# Patient Record
Sex: MALE | Race: WHITE | Employment: OTHER | URBAN - METROPOLITAN AREA
[De-identification: names, ages, dates, MRNs, and addresses within clinical notes are randomized per-mention and may not be internally consistent; named-entity substitution may affect disease eponyms.]

---

## 2019-01-01 ENCOUNTER — APPOINTMENT (OUTPATIENT)
Dept: CT IMAGING | Facility: HOSPITAL | Age: 84
DRG: 689 | End: 2019-01-01
Attending: INTERNAL MEDICINE
Payer: MEDICARE

## 2019-01-01 ENCOUNTER — APPOINTMENT (OUTPATIENT)
Dept: ULTRASOUND IMAGING | Facility: HOSPITAL | Age: 84
DRG: 689 | End: 2019-01-01
Attending: INTERNAL MEDICINE
Payer: MEDICARE

## 2019-01-01 ENCOUNTER — HOSPITAL ENCOUNTER (INPATIENT)
Facility: HOSPITAL | Age: 84
LOS: 7 days | Discharge: NURSING, REHAB OR LONG-TERM CARE | DRG: 689 | End: 2019-01-01
Attending: EMERGENCY MEDICINE | Admitting: INTERNAL MEDICINE
Payer: MEDICARE

## 2019-01-01 ENCOUNTER — APPOINTMENT (OUTPATIENT)
Dept: NUCLEAR MEDICINE | Facility: HOSPITAL | Age: 84
DRG: 689 | End: 2019-01-01
Attending: HOSPITALIST
Payer: MEDICARE

## 2019-01-01 ENCOUNTER — APPOINTMENT (OUTPATIENT)
Dept: CT IMAGING | Facility: HOSPITAL | Age: 84
DRG: 689 | End: 2019-01-01
Attending: Other
Payer: MEDICARE

## 2019-01-01 ENCOUNTER — APPOINTMENT (OUTPATIENT)
Dept: GENERAL RADIOLOGY | Facility: HOSPITAL | Age: 84
DRG: 689 | End: 2019-01-01
Attending: HOSPITALIST
Payer: MEDICARE

## 2019-01-01 ENCOUNTER — APPOINTMENT (OUTPATIENT)
Dept: GENERAL RADIOLOGY | Facility: HOSPITAL | Age: 84
DRG: 689 | End: 2019-01-01
Attending: EMERGENCY MEDICINE
Payer: MEDICARE

## 2019-01-01 VITALS
WEIGHT: 205 LBS | SYSTOLIC BLOOD PRESSURE: 117 MMHG | BODY MASS INDEX: 29.35 KG/M2 | HEIGHT: 70 IN | OXYGEN SATURATION: 97 % | DIASTOLIC BLOOD PRESSURE: 61 MMHG | HEART RATE: 67 BPM | RESPIRATION RATE: 18 BRPM | TEMPERATURE: 98 F

## 2019-01-01 DIAGNOSIS — R46.89 AGGRESSIVE BEHAVIOR: ICD-10-CM

## 2019-01-01 DIAGNOSIS — N30.00 ACUTE CYSTITIS WITHOUT HEMATURIA: Primary | ICD-10-CM

## 2019-01-01 LAB
ALBUMIN SERPL-MCNC: 2.8 G/DL (ref 3.4–5)
ALBUMIN SERPL-MCNC: 3.1 G/DL (ref 3.4–5)
ALBUMIN/GLOB SERPL: 0.6 {RATIO} (ref 1–2)
ALBUMIN/GLOB SERPL: 0.7 {RATIO} (ref 1–2)
ALP LIVER SERPL-CCNC: 72 U/L (ref 45–117)
ALP LIVER SERPL-CCNC: 84 U/L (ref 45–117)
ALT SERPL-CCNC: 17 U/L (ref 16–61)
ALT SERPL-CCNC: 18 U/L (ref 16–61)
ANION GAP SERPL CALC-SCNC: 3 MMOL/L (ref 0–18)
ANION GAP SERPL CALC-SCNC: 4 MMOL/L (ref 0–18)
ANION GAP SERPL CALC-SCNC: 5 MMOL/L (ref 0–18)
ANION GAP SERPL CALC-SCNC: 7 MMOL/L (ref 0–18)
ANION GAP SERPL CALC-SCNC: 7 MMOL/L (ref 0–18)
AST SERPL-CCNC: 16 U/L (ref 15–37)
AST SERPL-CCNC: 18 U/L (ref 15–37)
ATRIAL RATE: 82 BPM
BASOPHILS # BLD AUTO: 0.03 X10(3) UL (ref 0–0.2)
BASOPHILS # BLD AUTO: 0.07 X10(3) UL (ref 0–0.2)
BASOPHILS # BLD AUTO: 0.08 X10(3) UL (ref 0–0.2)
BASOPHILS NFR BLD AUTO: 0.4 %
BASOPHILS NFR BLD AUTO: 0.7 %
BASOPHILS NFR BLD AUTO: 0.8 %
BILIRUB SERPL-MCNC: 0.5 MG/DL (ref 0.1–2)
BILIRUB SERPL-MCNC: 0.5 MG/DL (ref 0.1–2)
BILIRUB UR QL STRIP.AUTO: NEGATIVE
BUN BLD-MCNC: 13 MG/DL (ref 7–18)
BUN BLD-MCNC: 15 MG/DL (ref 7–18)
BUN BLD-MCNC: 16 MG/DL (ref 7–18)
BUN BLD-MCNC: 23 MG/DL (ref 7–18)
BUN BLD-MCNC: 28 MG/DL (ref 7–18)
BUN/CREAT SERPL: 11.8 (ref 10–20)
BUN/CREAT SERPL: 15.2 (ref 10–20)
BUN/CREAT SERPL: 15.5 (ref 10–20)
BUN/CREAT SERPL: 21.1 (ref 10–20)
BUN/CREAT SERPL: 26.7 (ref 10–20)
CALCIUM BLD-MCNC: 8.5 MG/DL (ref 8.5–10.1)
CALCIUM BLD-MCNC: 8.7 MG/DL (ref 8.5–10.1)
CALCIUM BLD-MCNC: 8.9 MG/DL (ref 8.5–10.1)
CALCIUM BLD-MCNC: 8.9 MG/DL (ref 8.5–10.1)
CALCIUM BLD-MCNC: 9 MG/DL (ref 8.5–10.1)
CHLORIDE SERPL-SCNC: 104 MMOL/L (ref 98–112)
CHLORIDE SERPL-SCNC: 105 MMOL/L (ref 98–112)
CHLORIDE SERPL-SCNC: 106 MMOL/L (ref 98–112)
CHLORIDE SERPL-SCNC: 106 MMOL/L (ref 98–112)
CHLORIDE SERPL-SCNC: 108 MMOL/L (ref 98–112)
CHOLEST SMN-MCNC: 200 MG/DL (ref ?–200)
CLARITY UR REFRACT.AUTO: CLEAR
CO2 SERPL-SCNC: 28 MMOL/L (ref 21–32)
CO2 SERPL-SCNC: 29 MMOL/L (ref 21–32)
CO2 SERPL-SCNC: 29 MMOL/L (ref 21–32)
CO2 SERPL-SCNC: 30 MMOL/L (ref 21–32)
CO2 SERPL-SCNC: 30 MMOL/L (ref 21–32)
COLOR UR AUTO: YELLOW
CREAT BLD-MCNC: 0.97 MG/DL (ref 0.7–1.3)
CREAT BLD-MCNC: 1.05 MG/DL (ref 0.7–1.3)
CREAT BLD-MCNC: 1.05 MG/DL (ref 0.7–1.3)
CREAT BLD-MCNC: 1.09 MG/DL (ref 0.7–1.3)
CREAT BLD-MCNC: 1.1 MG/DL (ref 0.7–1.3)
DEPRECATED RDW RBC AUTO: 51.3 FL (ref 35.1–46.3)
DEPRECATED RDW RBC AUTO: 51.5 FL (ref 35.1–46.3)
DEPRECATED RDW RBC AUTO: 53.3 FL (ref 35.1–46.3)
DEPRECATED RDW RBC AUTO: 53.6 FL (ref 35.1–46.3)
EOSINOPHIL # BLD AUTO: 0.01 X10(3) UL (ref 0–0.7)
EOSINOPHIL # BLD AUTO: 0.19 X10(3) UL (ref 0–0.7)
EOSINOPHIL # BLD AUTO: 0.22 X10(3) UL (ref 0–0.7)
EOSINOPHIL NFR BLD AUTO: 0.1 %
EOSINOPHIL NFR BLD AUTO: 1.9 %
EOSINOPHIL NFR BLD AUTO: 2.2 %
ERYTHROCYTE [DISTWIDTH] IN BLOOD BY AUTOMATED COUNT: 14.6 % (ref 11–15)
ERYTHROCYTE [DISTWIDTH] IN BLOOD BY AUTOMATED COUNT: 14.6 % (ref 11–15)
ERYTHROCYTE [DISTWIDTH] IN BLOOD BY AUTOMATED COUNT: 14.8 % (ref 11–15)
ERYTHROCYTE [DISTWIDTH] IN BLOOD BY AUTOMATED COUNT: 14.9 % (ref 11–15)
GLOBULIN PLAS-MCNC: 4.4 G/DL (ref 2.8–4.4)
GLOBULIN PLAS-MCNC: 4.4 G/DL (ref 2.8–4.4)
GLUCOSE BLD-MCNC: 100 MG/DL (ref 70–99)
GLUCOSE BLD-MCNC: 125 MG/DL (ref 70–99)
GLUCOSE BLD-MCNC: 147 MG/DL (ref 70–99)
GLUCOSE BLD-MCNC: 85 MG/DL (ref 70–99)
GLUCOSE BLD-MCNC: 88 MG/DL (ref 70–99)
GLUCOSE UR STRIP.AUTO-MCNC: NEGATIVE MG/DL
HCT VFR BLD AUTO: 43.2 % (ref 39–53)
HCT VFR BLD AUTO: 45.5 % (ref 39–53)
HCT VFR BLD AUTO: 45.7 % (ref 39–53)
HCT VFR BLD AUTO: 48.3 % (ref 39–53)
HDLC SERPL-MCNC: 50 MG/DL (ref 40–59)
HGB BLD-MCNC: 13.9 G/DL (ref 13–17.5)
HGB BLD-MCNC: 14.5 G/DL (ref 13–17.5)
HGB BLD-MCNC: 14.6 G/DL (ref 13–17.5)
HGB BLD-MCNC: 15.3 G/DL (ref 13–17.5)
IMM GRANULOCYTES # BLD AUTO: 0.03 X10(3) UL (ref 0–1)
IMM GRANULOCYTES # BLD AUTO: 0.04 X10(3) UL (ref 0–1)
IMM GRANULOCYTES # BLD AUTO: 0.04 X10(3) UL (ref 0–1)
IMM GRANULOCYTES NFR BLD: 0.4 %
KETONES UR STRIP.AUTO-MCNC: NEGATIVE MG/DL
LDLC SERPL CALC-MCNC: 133 MG/DL (ref ?–100)
LEUKOCYTE ESTERASE UR QL STRIP.AUTO: NEGATIVE
LYMPHOCYTES # BLD AUTO: 1.33 X10(3) UL (ref 1–4)
LYMPHOCYTES # BLD AUTO: 2.15 X10(3) UL (ref 1–4)
LYMPHOCYTES # BLD AUTO: 2.57 X10(3) UL (ref 1–4)
LYMPHOCYTES NFR BLD AUTO: 19.2 %
LYMPHOCYTES NFR BLD AUTO: 21.5 %
LYMPHOCYTES NFR BLD AUTO: 25.3 %
M PROTEIN MFR SERPL ELPH: 7.2 G/DL (ref 6.4–8.2)
M PROTEIN MFR SERPL ELPH: 7.5 G/DL (ref 6.4–8.2)
MCH RBC QN AUTO: 30.6 PG (ref 26–34)
MCH RBC QN AUTO: 30.7 PG (ref 26–34)
MCH RBC QN AUTO: 30.9 PG (ref 26–34)
MCH RBC QN AUTO: 31 PG (ref 26–34)
MCHC RBC AUTO-ENTMCNC: 31.7 G/DL (ref 31–37)
MCHC RBC AUTO-ENTMCNC: 31.9 G/DL (ref 31–37)
MCHC RBC AUTO-ENTMCNC: 31.9 G/DL (ref 31–37)
MCHC RBC AUTO-ENTMCNC: 32.2 G/DL (ref 31–37)
MCV RBC AUTO: 96.2 FL (ref 80–100)
MCV RBC AUTO: 96.4 FL (ref 80–100)
MCV RBC AUTO: 96.6 FL (ref 80–100)
MCV RBC AUTO: 96.8 FL (ref 80–100)
MONOCYTES # BLD AUTO: 0.08 X10(3) UL (ref 0.1–1)
MONOCYTES # BLD AUTO: 0.66 X10(3) UL (ref 0.1–1)
MONOCYTES # BLD AUTO: 0.8 X10(3) UL (ref 0.1–1)
MONOCYTES NFR BLD AUTO: 1.2 %
MONOCYTES NFR BLD AUTO: 6.6 %
MONOCYTES NFR BLD AUTO: 7.9 %
NEUTROPHILS # BLD AUTO: 5.44 X10 (3) UL (ref 1.5–7.7)
NEUTROPHILS # BLD AUTO: 5.44 X10(3) UL (ref 1.5–7.7)
NEUTROPHILS # BLD AUTO: 6.47 X10 (3) UL (ref 1.5–7.7)
NEUTROPHILS # BLD AUTO: 6.47 X10(3) UL (ref 1.5–7.7)
NEUTROPHILS # BLD AUTO: 6.87 X10 (3) UL (ref 1.5–7.7)
NEUTROPHILS # BLD AUTO: 6.87 X10(3) UL (ref 1.5–7.7)
NEUTROPHILS NFR BLD AUTO: 63.8 %
NEUTROPHILS NFR BLD AUTO: 68.5 %
NEUTROPHILS NFR BLD AUTO: 78.7 %
NITRITE UR QL STRIP.AUTO: NEGATIVE
NONHDLC SERPL-MCNC: 150 MG/DL (ref ?–130)
OSMOLALITY SERPL CALC.SUM OF ELEC: 286 MOSM/KG (ref 275–295)
OSMOLALITY SERPL CALC.SUM OF ELEC: 290 MOSM/KG (ref 275–295)
OSMOLALITY SERPL CALC.SUM OF ELEC: 291 MOSM/KG (ref 275–295)
OSMOLALITY SERPL CALC.SUM OF ELEC: 296 MOSM/KG (ref 275–295)
OSMOLALITY SERPL CALC.SUM OF ELEC: 303 MOSM/KG (ref 275–295)
P AXIS: 47 DEGREES
P-R INTERVAL: 194 MS
PH UR STRIP.AUTO: 5 [PH] (ref 4.5–8)
PLATELET # BLD AUTO: 211 10(3)UL (ref 150–450)
PLATELET # BLD AUTO: 237 10(3)UL (ref 150–450)
PLATELET # BLD AUTO: 264 10(3)UL (ref 150–450)
PLATELET # BLD AUTO: 316 10(3)UL (ref 150–450)
POTASSIUM SERPL-SCNC: 3.8 MMOL/L (ref 3.5–5.1)
POTASSIUM SERPL-SCNC: 3.9 MMOL/L (ref 3.5–5.1)
POTASSIUM SERPL-SCNC: 4.1 MMOL/L (ref 3.5–5.1)
POTASSIUM SERPL-SCNC: 4.1 MMOL/L (ref 3.5–5.1)
POTASSIUM SERPL-SCNC: 4.3 MMOL/L (ref 3.5–5.1)
POTASSIUM SERPL-SCNC: 4.4 MMOL/L (ref 3.5–5.1)
PROT UR STRIP.AUTO-MCNC: NEGATIVE MG/DL
Q-T INTERVAL: 398 MS
QRS DURATION: 72 MS
QTC CALCULATION (BEZET): 464 MS
R AXIS: 41 DEGREES
RBC # BLD AUTO: 4.48 X10(6)UL (ref 3.8–5.8)
RBC # BLD AUTO: 4.72 X10(6)UL (ref 3.8–5.8)
RBC # BLD AUTO: 4.73 X10(6)UL (ref 3.8–5.8)
RBC # BLD AUTO: 5 X10(6)UL (ref 3.8–5.8)
RBC UR QL AUTO: NEGATIVE
SODIUM SERPL-SCNC: 138 MMOL/L (ref 136–145)
SODIUM SERPL-SCNC: 140 MMOL/L (ref 136–145)
SODIUM SERPL-SCNC: 143 MMOL/L (ref 136–145)
SP GR UR STRIP.AUTO: 1.05 (ref 1–1.03)
T AXIS: 24 DEGREES
TRIGL SERPL-MCNC: 84 MG/DL (ref 30–149)
TROPONIN I SERPL-MCNC: <0.045 NG/ML (ref ?–0.04)
UROBILINOGEN UR STRIP.AUTO-MCNC: <2 MG/DL
VENTRICULAR RATE: 82 BPM
VLDLC SERPL CALC-MCNC: 17 MG/DL (ref 0–30)
WBC # BLD AUTO: 10 X10(3) UL (ref 4–11)
WBC # BLD AUTO: 10 X10(3) UL (ref 4–11)
WBC # BLD AUTO: 10.1 X10(3) UL (ref 4–11)
WBC # BLD AUTO: 6.9 X10(3) UL (ref 4–11)

## 2019-01-01 PROCEDURE — 78582 LUNG VENTILAT&PERFUS IMAGING: CPT | Performed by: HOSPITALIST

## 2019-01-01 PROCEDURE — 71045 X-RAY EXAM CHEST 1 VIEW: CPT | Performed by: HOSPITALIST

## 2019-01-01 PROCEDURE — 71275 CT ANGIOGRAPHY CHEST: CPT | Performed by: INTERNAL MEDICINE

## 2019-01-01 PROCEDURE — 71045 X-RAY EXAM CHEST 1 VIEW: CPT | Performed by: EMERGENCY MEDICINE

## 2019-01-01 PROCEDURE — 99232 SBSQ HOSP IP/OBS MODERATE 35: CPT | Performed by: OTHER

## 2019-01-01 PROCEDURE — 90792 PSYCH DIAG EVAL W/MED SRVCS: CPT | Performed by: OTHER

## 2019-01-01 PROCEDURE — 05HC33Z INSERTION OF INFUSION DEVICE INTO LEFT BASILIC VEIN, PERCUTANEOUS APPROACH: ICD-10-PCS | Performed by: HOSPITALIST

## 2019-01-01 PROCEDURE — 70498 CT ANGIOGRAPHY NECK: CPT | Performed by: OTHER

## 2019-01-01 PROCEDURE — 70450 CT HEAD/BRAIN W/O DYE: CPT | Performed by: INTERNAL MEDICINE

## 2019-01-01 PROCEDURE — B54NZZA ULTRASONOGRAPHY OF LEFT UPPER EXTREMITY VEINS, GUIDANCE: ICD-10-PCS | Performed by: HOSPITALIST

## 2019-01-01 PROCEDURE — 70496 CT ANGIOGRAPHY HEAD: CPT | Performed by: OTHER

## 2019-01-01 PROCEDURE — 93970 EXTREMITY STUDY: CPT | Performed by: INTERNAL MEDICINE

## 2019-01-01 RX ORDER — SODIUM CHLORIDE 9 MG/ML
INJECTION, SOLUTION INTRAVENOUS CONTINUOUS
Status: ACTIVE | OUTPATIENT
Start: 2019-01-01 | End: 2019-01-01

## 2019-01-01 RX ORDER — MAGNESIUM HYDROXIDE/ALUMINUM HYDROXICE/SIMETHICONE 120; 1200; 1200 MG/30ML; MG/30ML; MG/30ML
SUSPENSION ORAL 4 TIMES DAILY PRN
COMMUNITY

## 2019-01-01 RX ORDER — ASPIRIN 81 MG/1
81 TABLET, CHEWABLE ORAL DAILY
Qty: 30 TABLET | Refills: 0 | Status: SHIPPED | OUTPATIENT
Start: 2019-01-01

## 2019-01-01 RX ORDER — ATORVASTATIN CALCIUM 10 MG/1
10 TABLET, FILM COATED ORAL NIGHTLY
Status: DISCONTINUED | OUTPATIENT
Start: 2019-01-01 | End: 2019-01-01

## 2019-01-01 RX ORDER — GUAIFENESIN/DEXTROMETHORPHAN 100-10MG/5
5 SYRUP ORAL 3 TIMES DAILY PRN
Status: DISCONTINUED | OUTPATIENT
Start: 2019-01-01 | End: 2019-01-01

## 2019-01-01 RX ORDER — QUETIAPINE 25 MG/1
50 TABLET, FILM COATED ORAL NIGHTLY
Status: DISCONTINUED | OUTPATIENT
Start: 2019-01-01 | End: 2019-01-01

## 2019-01-01 RX ORDER — ATORVASTATIN CALCIUM 10 MG/1
10 TABLET, FILM COATED ORAL NIGHTLY
Qty: 30 TABLET | Refills: 0 | Status: SHIPPED | OUTPATIENT
Start: 2019-01-01

## 2019-01-01 RX ORDER — AMLODIPINE BESYLATE 5 MG/1
5 TABLET ORAL DAILY
COMMUNITY

## 2019-01-01 RX ORDER — ENOXAPARIN SODIUM 100 MG/ML
1 INJECTION SUBCUTANEOUS EVERY 12 HOURS SCHEDULED
Status: DISCONTINUED | OUTPATIENT
Start: 2019-01-01 | End: 2019-01-01

## 2019-01-01 RX ORDER — QUETIAPINE 25 MG/1
25 TABLET, FILM COATED ORAL 2 TIMES DAILY PRN
Status: DISCONTINUED | OUTPATIENT
Start: 2019-01-01 | End: 2019-01-01

## 2019-01-01 RX ORDER — DIVALPROEX SODIUM 125 MG/1
125 CAPSULE, COATED PELLETS ORAL EVERY 8 HOURS SCHEDULED
Status: ON HOLD | COMMUNITY
End: 2019-01-01

## 2019-01-01 RX ORDER — FINASTERIDE 5 MG/1
5 TABLET, FILM COATED ORAL DAILY
Status: DISCONTINUED | OUTPATIENT
Start: 2019-01-01 | End: 2019-01-01

## 2019-01-01 RX ORDER — FUROSEMIDE 20 MG/1
20 TABLET ORAL EVERY OTHER DAY
COMMUNITY

## 2019-01-01 RX ORDER — ALBUTEROL SULFATE 2.5 MG/3ML
2.5 SOLUTION RESPIRATORY (INHALATION) EVERY 4 HOURS PRN
Status: DISCONTINUED | OUTPATIENT
Start: 2019-01-01 | End: 2019-01-01

## 2019-01-01 RX ORDER — SODIUM CHLORIDE 0.9 % (FLUSH) 0.9 %
10 SYRINGE (ML) INJECTION EVERY 12 HOURS
Status: DISCONTINUED | OUTPATIENT
Start: 2019-01-01 | End: 2019-01-01

## 2019-01-01 RX ORDER — QUETIAPINE 25 MG/1
50 TABLET, FILM COATED ORAL 3 TIMES DAILY
Status: ON HOLD | COMMUNITY
End: 2019-01-01

## 2019-01-01 RX ORDER — FUROSEMIDE 20 MG/1
20 TABLET ORAL EVERY OTHER DAY
Status: DISCONTINUED | OUTPATIENT
Start: 2019-01-01 | End: 2019-01-01

## 2019-01-01 RX ORDER — ENOXAPARIN SODIUM 100 MG/ML
50 INJECTION SUBCUTANEOUS ONCE
Status: COMPLETED | OUTPATIENT
Start: 2019-01-01 | End: 2019-01-01

## 2019-01-01 RX ORDER — MEMANTINE HYDROCHLORIDE 10 MG/1
10 TABLET ORAL 2 TIMES DAILY WITH MEALS
COMMUNITY

## 2019-01-01 RX ORDER — FINASTERIDE 5 MG/1
5 TABLET, FILM COATED ORAL DAILY
COMMUNITY

## 2019-01-01 RX ORDER — ENOXAPARIN SODIUM 100 MG/ML
40 INJECTION SUBCUTANEOUS EVERY EVENING
Status: DISCONTINUED | OUTPATIENT
Start: 2019-01-01 | End: 2019-01-01

## 2019-01-01 RX ORDER — ASPIRIN 81 MG/1
81 TABLET, CHEWABLE ORAL DAILY
Status: DISCONTINUED | OUTPATIENT
Start: 2019-01-01 | End: 2019-01-01

## 2019-01-01 RX ORDER — DIPHENHYDRAMINE HCL 50 MG
50 CAPSULE ORAL ONCE
Status: COMPLETED | OUTPATIENT
Start: 2019-01-01 | End: 2019-01-01

## 2019-01-01 RX ORDER — MIRTAZAPINE 30 MG/1
30 TABLET, FILM COATED ORAL NIGHTLY
COMMUNITY

## 2019-01-01 RX ORDER — NYSTATIN 100000 U/G
CREAM TOPICAL 2 TIMES DAILY
COMMUNITY

## 2019-01-01 RX ORDER — OMEPRAZOLE 20 MG/1
20 CAPSULE, DELAYED RELEASE ORAL
COMMUNITY

## 2019-01-01 RX ORDER — HALOPERIDOL 1 MG/1
1 TABLET ORAL 4 TIMES DAILY
Status: ON HOLD | COMMUNITY
End: 2019-01-01

## 2019-01-01 RX ORDER — DOXYCYCLINE HYCLATE 100 MG/1
100 CAPSULE ORAL EVERY 12 HOURS SCHEDULED
Qty: 1 CAPSULE | Refills: 0 | Status: SHIPPED | OUTPATIENT
Start: 2019-01-01

## 2019-01-01 RX ORDER — ACETAMINOPHEN 325 MG/1
650 TABLET ORAL EVERY 6 HOURS PRN
Status: DISCONTINUED | OUTPATIENT
Start: 2019-01-01 | End: 2019-01-01

## 2019-01-01 RX ORDER — GUAIFENESIN/DEXTROMETHORPHAN 100-10MG/5
5 SYRUP ORAL 3 TIMES DAILY PRN
COMMUNITY
End: 2019-01-01 | Stop reason: ALTCHOICE

## 2019-01-01 RX ORDER — PANTOPRAZOLE SODIUM 40 MG/1
40 TABLET, DELAYED RELEASE ORAL
Status: DISCONTINUED | OUTPATIENT
Start: 2019-01-01 | End: 2019-01-01

## 2019-01-01 RX ORDER — HALOPERIDOL 1 MG/1
1 TABLET ORAL EVERY 6 HOURS PRN
Status: DISCONTINUED | OUTPATIENT
Start: 2019-01-01 | End: 2019-01-01

## 2019-01-01 RX ORDER — POLYETHYLENE GLYCOL 3350 17 G/17G
17 POWDER, FOR SOLUTION ORAL EVERY OTHER DAY
Status: DISCONTINUED | OUTPATIENT
Start: 2019-01-01 | End: 2019-01-01

## 2019-01-01 RX ORDER — TRAZODONE HYDROCHLORIDE 50 MG/1
50 TABLET ORAL NIGHTLY
Status: DISCONTINUED | OUTPATIENT
Start: 2019-01-01 | End: 2019-01-01

## 2019-01-01 RX ORDER — POLYETHYLENE GLYCOL 3350 17 G/17G
17 POWDER, FOR SOLUTION ORAL EVERY OTHER DAY
COMMUNITY

## 2019-01-01 RX ORDER — TRAZODONE HYDROCHLORIDE 50 MG/1
50 TABLET ORAL NIGHTLY
COMMUNITY

## 2019-01-01 RX ORDER — DOXYCYCLINE HYCLATE 100 MG/1
100 CAPSULE ORAL EVERY 12 HOURS SCHEDULED
Status: DISCONTINUED | OUTPATIENT
Start: 2019-01-01 | End: 2019-01-01

## 2019-01-01 RX ORDER — QUETIAPINE 50 MG/1
50 TABLET, FILM COATED ORAL NIGHTLY
Qty: 30 TABLET | Refills: 0 | Status: SHIPPED | OUTPATIENT
Start: 2019-01-01

## 2019-01-01 RX ORDER — MEMANTINE HYDROCHLORIDE 10 MG/1
10 TABLET ORAL 2 TIMES DAILY WITH MEALS
Status: DISCONTINUED | OUTPATIENT
Start: 2019-01-01 | End: 2019-01-01

## 2019-01-01 RX ORDER — PANTOPRAZOLE SODIUM 20 MG/1
20 TABLET, DELAYED RELEASE ORAL
Status: DISCONTINUED | OUTPATIENT
Start: 2019-01-01 | End: 2019-01-01

## 2019-01-01 RX ORDER — MIRTAZAPINE 15 MG/1
30 TABLET, FILM COATED ORAL NIGHTLY
Status: DISCONTINUED | OUTPATIENT
Start: 2019-01-01 | End: 2019-01-01

## 2019-01-01 RX ORDER — ACETAMINOPHEN 325 MG/1
325 TABLET ORAL EVERY 6 HOURS PRN
COMMUNITY

## 2019-01-01 RX ORDER — ALBUTEROL SULFATE 2.5 MG/3ML
SOLUTION RESPIRATORY (INHALATION) EVERY 6 HOURS PRN
COMMUNITY

## 2019-01-01 RX ORDER — METOPROLOL TARTRATE 5 MG/5ML
5 INJECTION INTRAVENOUS 2 TIMES DAILY
Status: DISCONTINUED | OUTPATIENT
Start: 2019-01-01 | End: 2019-01-01

## 2019-01-01 RX ORDER — POTASSIUM CHLORIDE 20 MEQ/1
40 TABLET, EXTENDED RELEASE ORAL ONCE
Status: COMPLETED | OUTPATIENT
Start: 2019-01-01 | End: 2019-01-01

## 2019-01-01 RX ORDER — CALCIUM CARBONATE 200(500)MG
500 TABLET,CHEWABLE ORAL DAILY
Status: DISCONTINUED | OUTPATIENT
Start: 2019-01-01 | End: 2019-01-01

## 2019-01-01 RX ORDER — MAGNESIUM HYDROXIDE/ALUMINUM HYDROXICE/SIMETHICONE 120; 1200; 1200 MG/30ML; MG/30ML; MG/30ML
30 SUSPENSION ORAL 4 TIMES DAILY PRN
Status: DISCONTINUED | OUTPATIENT
Start: 2019-01-01 | End: 2019-01-01

## 2019-01-01 RX ORDER — FUROSEMIDE 10 MG/ML
20 INJECTION INTRAMUSCULAR; INTRAVENOUS DAILY
Status: DISCONTINUED | OUTPATIENT
Start: 2019-01-01 | End: 2019-01-01

## 2019-01-01 RX ORDER — AMLODIPINE BESYLATE 5 MG/1
5 TABLET ORAL DAILY
Status: DISCONTINUED | OUTPATIENT
Start: 2019-01-01 | End: 2019-01-01

## 2019-01-01 RX ORDER — CALCIUM CARBONATE 200(500)MG
1 TABLET,CHEWABLE ORAL DAILY
COMMUNITY

## 2019-01-01 RX ORDER — CIPROFLOXACIN 250 MG/1
TABLET, FILM COATED ORAL 2 TIMES DAILY
Status: ON HOLD | COMMUNITY
End: 2019-01-01 | Stop reason: ALTCHOICE

## 2019-08-14 PROBLEM — N30.00 ACUTE CYSTITIS WITHOUT HEMATURIA: Status: ACTIVE | Noted: 2019-01-01

## 2019-08-14 PROBLEM — R46.89 AGGRESSIVE BEHAVIOR: Status: ACTIVE | Noted: 2019-01-01

## 2019-08-14 NOTE — ED PROVIDER NOTES
Patient Seen in: [facility] Emergency Department    History   Patient presents with:  Eval-P (psychiatric)    Stated Complaint: Eval-P    HPI    59-year-old male comes to the hospital complaint having difficulty with having some increased aggressive confused  HEENT : NCAT, EOMI, PEERL, throat clear, neck supple, no JVD, trachea midline, No LAD  Heart: S1S2 normal. No murmurs, regular rate and rhythm  Lungs: Clear to auscultation bilaterally  Abdomen: Soft nontender nondistended normal active bowel reickson history at this time. FINDINGS:  Low lung volumes. Magnified heart and normal pulmonary vascularity. Tortuous and calcified aorta. Bibasilar discoid atelectasis, greater on the right. No focal consolidation. Degenerative changes of thoracic spine.

## 2019-08-14 NOTE — ED INITIAL ASSESSMENT (HPI)
Patient arrives by ems for eval P from nursing home. Petition completed by staff reports increasing agitation/amxiety and aggression, hitting head with a phone as well as punching a door. Hx of dementia.  Per daughter patient has been diagnosed with UTI and

## 2019-08-14 NOTE — ED NOTES
Report called to [facility] RN at this time. Transport arranged. Asepsis and dressing applied to left hand, completed by Moe ROSSI. Daughter remains at bedside.

## 2019-08-14 NOTE — ED NOTES
Spoke with patient's nurse at [facility], requested they fax urine culture results. MD informed of patient already having UA and culture done. States we do not need to collect additional specimen at this time.

## 2019-08-14 NOTE — ED NOTES
Patient and his daughter remain updated with plan for admission. Patient resting quietly on cart for the most part, occasionally will yell out. Able to redirect at this time. Daughter remains at bedside. No attempts made to get out of bed.  Daughter states

## 2019-08-15 NOTE — PLAN OF CARE
Assumed care at [address]. Admitted from [place] [address] for UTI and increased confusion. Pt is A&O x1, oriented to self. Calm, but yells out at times, redirectable. On RA.   NSR on tele. Denies pain. Incontinent, briefed. Electrolyte protocol.    Pt will eval.

## 2019-08-15 NOTE — PLAN OF CARE
Assumed care of patient at [address] [place] [address]. Patient A&Ox1, able to follow commands. Meds directed to be whole in puree--discovered per daughter that crushed in puree is preferred. Patient does NOT like applesauce; pudding, ice cream, or yogurt preferred.   IV Merrem illegal substances which indicate the need for search of the patient and/or belongings  - Encourage verbalization of thoughts and concerns in a socially appropriate manner  - Utilize positive, consistent limit setting strategies supporting safety of patien

## 2019-08-15 NOTE — H&P
[facility] Hospitalist History and Physical      Patient presents with:  Eval-P (psychiatric)       PCP: Chago Rodriguez MD      History of Present Illness: Patient is a 80year old male with PMH sig for dementia, gerd, HTN, BPH, anxiety and HL who presents to Extremities normal, atraumatic, no cyanosis or edema.    Skin: Skin color, texture normal.   Neurologic: Moving all extremities spontaneously     Data Review:    LABS:   Lab Results   Component Value Date    WBC 10.0 08/14/2019    HGB 15.3 08/14/2019    HCT Greater than 50% face to face encounter.           **Certification      PHYSICIAN Certification of Need for Inpatient Hospitalization - Initial Certification    Patient will require inpatient services that will reasonably be expected to span two midnight's

## 2019-08-15 NOTE — CM/SW NOTE
9:21am  MSW called [facility] [place]-[phone], message left for K dept to find out what part (AL or skilled) that patient lives in.    10:12am- Dakota Haddad called from NH and confirmed pt is private pay in skilled.   Fax clinicals to Destiney Villanueva  Fax: 6

## 2019-08-15 NOTE — PROGRESS NOTES
NURSING ADMISSION NOTE      Patient admitted via Cart  Oriented to room. Safety precautions initiated. Bed in low position. Call light in reach. AoX1. VSS, on RA. No c/o pain. Daughter at bedside. Lying comfortably in bed. Med rec complete.

## 2019-08-15 NOTE — PROGRESS NOTES
PSYCH CONSULT    Date of Admission: 8/14/19  Date of Consult: 8/14/19  Reason for Consultation: Agitation, altered mental status    Impression:  Primary Psychiatric Diagnosis:  Acute delirium from UTI    Dementia with behavioral disturbance    Anxiety and

## 2019-08-15 NOTE — CONSULTS
[facility]  Report of Psychiatric Consultation    Kit Browntyrone Patient Status:  Inpatient    1932 MRN [id number]   [facility] 3NE-A Attending Chino Marie, [address] [facility] Day # 1 PCP Meghan Bustamante MD     Date of Admissio was admitted to the [facility] inpatient psych unit for treatment of his dementia with agitation. Unfortunately, he was oversedated and developed resp failure requiring intubation. He required a PEG temporarily.      In 2016, he went to the [facility] UNKNOWN  Penicillin G Benzat*    UNKNOWN  Sulfa Antibiotics       UNKNOWN    Medications:    Current Facility-Administered Medications:   •  Enoxaparin Sodium (LOVENOX) 40 MG/0.4ML injection 40 mg, 40 mg, Subcutaneous, QPM  •  acetaminophen (TYLENOL) Concentration:   poor  Memory:  impaired recent  Language: unable to test naming or repetition    Insight: limited  Judgment: limited    Laboratory Data:  Lab Results   Component Value Date    WBC 10.1 08/15/2019    HGB 14.6 08/15/2019    HCT 45.7 08/15/20

## 2019-08-15 NOTE — PHYSICAL THERAPY NOTE
PHYSICAL THERAPY QUICK EVALUATION - INPATIENT    Room Number: 3954/3045-L  Evaluation Date: 8/15/2019  Presenting Problem: aggressive behavior  Physician Order: PT Eval and Treat    Problem List  Principal Problem:    Acute cystitis without hematuria  Ac bedside commode, etc.): A Lot   -   Moving from lying on back to sitting on the side of the bed?: A Lot   How much help from another person does the patient currently need. ..   -   Moving to and from a bed to a chair (including a wheelchair)?: A Lot   - to meaningfully participate in skilled therapy. PT Discharge Recommendations: 24 hour care/supervision; Long term care    PLAN  Patient has been evaluated and presents with no skilled Physical Therapy needs at this time.   Patient discharged from Physical

## 2019-08-15 NOTE — PROGRESS NOTES
[facility] Hospitalist Progress Note                                                                   [address]  1/9/1932    SUBJECTIVE: says hes ok.  Neva Sergiol at me to get out    OBJECTIVE vanc for prophylaxis     #BPH- cont  proscar  #GERD- PPI  #HTN- amlodipine     PPX: lovenox     Tomy Dick  [place] Hospitalist  [phone]

## 2019-08-16 NOTE — PROGRESS NOTES
[facility]  Report of Psychiatric Progress Note    Zoë Meléndez Patient Status:  Inpatient    1932 MRN [id number]   [facility] 3NE-A Attending Frank De La Torre, [address] [facility] [address] Day # 2 PCP Yuliana Stinson MD     Date of Admissi [facility]'s inpatient psych unit for treatment of his dementia with agitation. Unfortunately, he was oversedated and developed resp failure requiring intubation. He required a PEG temporarily.      In 2016, he went to the the memory care unit in [address] [place] [address] Essential hypertension    • Hyperlipidemia    • Subdural hematoma ([facility] [address]      History reviewed. No pertinent surgical history. No family history on file. reports that he has never smoked.  He has never used smokeless tobacco.    Allergies:    Iodine (Topi [facility]) 50 MG/ML oral solution 125 mg, 125 mg, Oral, BID    Review of Systems   Unable to perform ROS: Dementia     Mental Status Exam:     Objective       08/16/19 1600   BP:    Pulse: 75   Resp: 23   Temp: 98.2 °F (36.8 °C)     Appearance: fair groomi

## 2019-08-16 NOTE — CM/SW NOTE
11:47am  MSW called [facility]-[phone] to discuss if patient can return to their facility. Message left for Spenser Hermosillo in admissions. MSW called back to [facility] and left message for Cory Cunningham.      MSW called pt's Dtr Tara [phone]

## 2019-08-16 NOTE — PLAN OF CARE
Assumed patient care at 0730. VSS. Room air. Tele, NSR.   A&Ox1. Denies pain.   + UTI, IV Doxycycline and PO Vanco.   Patient refusing PO meds, changed to IV. Seroquel scheduled and prn for agitation, hx of dementia. Psych following.   L arm midline place behavioral management agreement  - Implement a Health Care Agreement if patient meets criteria  - If a patient’s behavior jeopardizes the safety of the patient, staff, or others refer to organization policy.  If a visitor’s behavior poses a threat to safety

## 2019-08-16 NOTE — PROGRESS NOTES
[facility] Hospitalist Progress Note                                                                   [address]  1/9/1932    SUBJECTIVE:  Pt seen and examined.    Not agitated this AM, n magnesium hydroxide    8/11/19 UCx (from NH):  E coli > 100K  S to azactam  S to tetracycline   S to bactrim  S to cephalosporins    Assessment/Plan:  Principal Problem:    Acute cystitis without hematuria  Active Problems:    Aggressive behavior    #Demen

## 2019-08-16 NOTE — CM/SW NOTE
2:00pm  MSW called [facility]-[phone] to discuss if patient can return to their facility. MSW spoke Avelino Keene. Dir of [facility] [email or web address]. Day of d/c- copy of Md note that states patient can be d/c to nursing home.      Sunday dc

## 2019-08-16 NOTE — PLAN OF CARE
Assumed care at [address]. Pt is A&O x1, oriented to self. Hx of dementia, calm, but yells out at times, redirectable. Pt yells out at when he needs to be changed, does not understand how to use the call light. On RA.   NSR on tele. Denies pain.   VSS.

## 2019-08-17 NOTE — PLAN OF CARE
Received awake in  bed, quiet but abruptly yells. Not in resp distress. Dim lungs. On room air. SR/ ST on tele. Incontinent, briefed. Refused oral meds , spits it out. Attempted so many times to take his meds but says no and would swing his arms at me.  CHG others refer to organization policy.  If a visitor’s behavior poses a threat to safety call refer to organization policy.  - Initiate consult with , Psychosocial CNS, Spiritual Care as appropriate  Outcome: Progressing     Problem: Patient/Fami

## 2019-08-17 NOTE — PROGRESS NOTES
[facility] Hospitalist Progress Note                                                                   [address]  1/9/1932    SUBJECTIVE:  Pt seen and examined.   Doing well, no complaint Alum & Mag Hydroxide-Simeth, guaiFENesin-DM, magnesium hydroxide    8/11/19 UCx (from NH):  E coli > 100K  S to azactam  S to tetracycline   S to bactrim  S to cephalosporins    Assessment/Plan:  Principal Problem:    Acute cystitis without hematuria  Acti

## 2019-08-17 NOTE — PLAN OF CARE
Assumed patient care at 0730. VSS. Room air. Tele, NSR.   A&Ox1. Denies pain. IV abx and PO vanco for UTI. Patient refusing oral meds. L midline, L arm precautions. Plan to d/c tomorrow back to NH  No further needs at this time.   Will continue to [facility] If a visitor’s behavior poses a threat to safety call refer to organization policy.  - Initiate consult with , Psychosocial CNS, Spiritual Care as appropriate  Outcome: Progressing

## 2019-08-18 NOTE — PLAN OF CARE
Assumed care of patient at 1700. Evening meds given. Discussed with family (daughter and wife) the best method of obtaining UA. Family Opting to attempt condom cath. Currently eating dinner and RN will attach when completed. A/o x1   Confused.    Lawanda

## 2019-08-18 NOTE — PROGRESS NOTES
[facility] Hospitalist Progress Note                                                                   [address]  1/9/1932    SUBJECTIVE:  Pt seen and examined.    Pt says marciano burch 125 mg Oral BID     Continuous Infusions:   PRN: QUEtiapine Fumarate, acetaminophen, albuterol sulfate, Alum & Mag Hydroxide-Simeth, guaiFENesin-DM, magnesium hydroxide    8/11/19 UCx (from NH):  E coli > 100K  S to azactam  S to tetracycline   S to [place]

## 2019-08-18 NOTE — PLAN OF CARE
Aox1  VSS, on RA  O2 prn   No c/o pain   Tele- NSR  Neuro consulted   IV Doxycycline   Psych on   Report given to Ela Ezra 69 at 5pm     Problem: CONFUSION  Goal: Confusion, delirium, dementia or psychosis is improved or at baseline  Description  INTERVENTION Psychosocial CNS, Spiritual Care as appropriate  Outcome: Progressing     Problem: Patient/Family Goals  Goal: Patient/Family Long Term Goal  Description  Patient's Long Term Goal: To return to a nursing home/memory care facility receiving good treatment.

## 2019-08-18 NOTE — CONSULTS
Neurology consult NOTE    The reason for consultation:    Abnormal CT of brain. HPI:   The chart was obtained from the pt's daughter and wife who were at bed side.    Patient is a 80year old male with PMH sig for dementia, gerd, HTN, BPH, anxiety and HL or organization: Not on file        Attends meetings of clubs or organizations: Not on file        Relationship status: Not on file      Intimate partner violence:        Fear of current or ex partner: Not on file        Emotionally abused: Not on file Oral Tab Take 325 mg by mouth every 6 (six) hours as needed for Pain. Disp:  Rfl:    magnesium hydroxide (MILK OF MAGNESIA) 400 MG/5ML Oral Suspension Take by mouth daily as needed for constipation.  Disp:  Rfl:    Divalproex Sodium 125 MG Oral Capsule Cortney as his baseline. REFLEXES:   absent. PLANTAR RESPONSE: down going toes bilaterally.      LAB:     BMP:   No results found for: GLUCOSE  Lab Results   Component Value Date    K 4.1 08/17/2019    K 4.1 08/16/2019    K 3.8 08/15/2019     Lab Results   Com air cells are unremarkable. Visualized portions of the orbits are unremarkable. IMPRESSION: Global parenchymal volume loss is increased when compared to prior examination from 2015. Sequelae of chronic small vessel ischemic disease is noted.  No evidence of

## 2019-08-18 NOTE — PLAN OF CARE
Received patient asleep in bed. He is nice when approached but gets  irritable after  talking with him. He will say \"stop, go away\". He is aggressive when changing brief. He grabs, punches, swings his hands and kicks. He refused dinner tonight.  He had a behavior jeopardizes the safety of the patient, staff, or others refer to organization policy.  If a visitor’s behavior poses a threat to safety call refer to organization policy.  - Initiate consult with , Psychosocial CNS, Spiritual Care as a

## 2019-08-19 NOTE — CONSULTS
Pulmonary H&P/Consult       NAME: Leny [address] Road: [id number] - MRN: [id number] - Age: 80year old - :  1932    Date of Admission: 2019  1:38 PM  Admission Diagnosis: Aggressive behavior [R46.89]  Acute cystitis without hematuria [N 10 mg by mouth 2 (two) times daily with meals. Disp:  Rfl:  8/13/2019 at 6pm   metoprolol Tartrate 25 MG Oral Tab Take 25 mg by mouth 2 (two) times daily. Disp:  Rfl:  8/13/2019 at 5pm   mirtazapine 30 MG Oral Tab Take 30 mg by mouth nightly.  Disp:  Rfl: 5-10 YEARS AGO NOT @[facility]    Social History:  Social History    Socioeconomic History      Marital status:       Spouse name: Not on file      Number of children: Not on file      Years of education: Not on file      Highest education level: N Take 17 g by mouth every other day. Disp:  Rfl:    amLODIPine Besylate 5 MG Oral Tab Take 5 mg by mouth daily. Disp:  Rfl:    Calcium Carbonate Antacid 500 MG Oral Chew Tab Chew 1 tablet by mouth daily.  Disp:  Rfl:    finasteride 5 MG Oral Tab Take 5 mg Fumarate  50 mg Oral Nightly   • enoxaparin  40 mg Subcutaneous QPM   • amLODIPine Besylate  5 mg Oral Daily   • Calcium Carbonate Antacid  500 mg Oral Daily   • finasteride  5 mg Oral Daily   • Memantine HCl  10 mg Oral BID with meals   • mirtazapine  30 lb (93 kg)   SpO2 98%   BMI 29.41 kg/m²     General Appearance:    Alert, cooperative, no distress, appears stated age   Head:    Normocephalic, without obvious abnormality, atraumatic   Eyes:    PERRL, conjunctiva/corneas clear, EOM's intact, both eyes Alv[facility]

## 2019-08-19 NOTE — PROGRESS NOTES
[facility]  Neurology  Hospital Progress Report    Adelaide Poolting Patient Status:  Inpatient    1932 MRN [id number]   [facility] 3NE-A Attending Edda De Luna, [address] [facility] Day # 5 PCP Milton Gardiner MD   Date of Admi (SEROQUEL) tab 50 mg 50 mg Oral Nightly   QUEtiapine Fumarate (SEROQUEL) tab 25 mg 25 mg Oral BID PRN   Enoxaparin Sodium (LOVENOX) 40 MG/0.4ML injection 40 mg 40 mg Subcutaneous QPM   acetaminophen (TYLENOL) tab 650 mg 650 mg Oral Q6H PRN   albuterol sulf every morning before breakfast.   traZODone HCl 50 MG Oral Tab Take 50 mg by mouth nightly. cholecalciferol (VITAMIN D3) 5000 units Oral Cap Take 5,000 Units by mouth daily.    acetaminophen 325 MG Oral Tab Take 325 mg by mouth every 6 (six) hours as need myoclonus. Sensation:  no neglect. Coordination:   unable to assess. Reflexes: There is no clonus at the ankle.     Results:   Laboratory Data:  Lab Results   Component Value Date    WBC 6.9 08/19/2019    HGB 14.5 08/19/2019    HCT 45.5 08/19/2019    PL prior examination from 2015. Sequelae of chronic small vessel ischemic disease is noted. No evidence of intracranial hemorrhage or extra-axial fluid collection. Focal hyperdensity in the distal left M1 segment is noted.   This is likely due to atherosclero care team the further evaluation and treatment of pulmonary embolus. I discussed this with the nurse.     Reji Nair M.D.  8/19/2019

## 2019-08-19 NOTE — PLAN OF CARE
Aox1  Agitated at times  VSS, on RA  O2 prn   No c/o pain   Tele- NSR  Neuro on consult  IV Doxycycline   Psych on   Chest CT tomorrow,   SubQ Lovenox    Problem: CONFUSION  Goal: Confusion, delirium, dementia or psychosis is improved or at baseline  Descr , Psychosocial CNS, Spiritual Care as appropriate  Outcome: Progressing     Problem: Patient/Family Goals  Goal: Patient/Family Long Term Goal  Description  Patient's Long Term Goal: To return to a nursing home/memory care facility receiving g

## 2019-08-19 NOTE — CM/SW NOTE
10:15am  MSW sent updated Mar and MD note via Fax: [phone] to [id number] [facility].

## 2019-08-19 NOTE — PROGRESS NOTES
[facility] hospitalist daily note  Patient was seen/examined on 8/19/19    S; pt denies nausea/emesis  No chest pain,  no abd pain  Does say yes when asked if SOB    Medications in Epic    PE    08/19/19  1330   BP:    Pulse: 84   Resp:    Temp:      Gen: awake, check VQ to rule out PE, Pulm consult     PPX: lovenox Subcutaneous  Dispo: inpt care. Not ready for Britt Cheney MD  [facility] hospitalist  [phone]    Addendum;  Updated pt's daughter Mika Khan with pt's permission.  Spoke abnormal CT brain, intermediate

## 2019-08-19 NOTE — PLAN OF CARE
Assumed care of patient at [address] [place] [address]. Monitor on tele-NSR,  placed on O2 HS for desat. HOB elevated. IV antibiotics.  used for assessment. Plan for CTA in am. Prep with prednisone/benadryl. Labs. Fall precautions in place.  Will continue to monitor
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
## 2019-08-20 NOTE — CM/SW NOTE
11:17am  MSW called [facility]-[phone] to discuss if patient return to their facility. MSW left message for Renato Dunne. Dir of [facility] [employer]. Updates sent in ECIN to nursing home  Per Dr. Heaven Abdi pt should d/c wed.     2:13

## 2019-08-20 NOTE — PLAN OF CARE
Assumed care for this pt at 0000. Pt alert, confused and forgetful, vss, pt incontinent, prep for CTA. IV and po antibiotics.  Will continue to follow

## 2019-08-20 NOTE — PROGRESS NOTES
[address] Patient Status:  Inpatient    1932 MRN [id number]   [facility] 3NE-A Attending Zaid Eller MD   Hosp Day # 6 PCP Meghan Bustamante MD     SUBJECTIVE: no new events.   Underwent CTA chest which c magnesium hydroxide (MILK OF MAGNESIA) 400 MG/5ML suspension 30 mL, 30 mL, Oral, Daily PRN  •  Memantine HCl (NAMENDA) tab 10 mg, 10 mg, Oral, BID with meals  •  mirtazapine (REMERON) tab 30 mg, 30 mg, Oral, Nightly  •  PEG 3350 (MIRALAX) powder packet 17

## 2019-08-20 NOTE — CM/SW NOTE
11:38am  msw met with pt's dtr at bedside. She hopes for patient to return to [facility] for claudine or in their memory care unit.

## 2019-08-20 NOTE — PLAN OF CARE
Assumed patient care at 0730. VSS. 2L O2 Nc. A&Ox1. Tele, NSR. Denies pain. CT chest + for PE started on Eliquis. Electrolyte protocol. Taking oral medications crushed with applesauce. Neuro, Pulm, Psych following.   Plan to D/C back to nursing [facility] the patient, staff, or others refer to organization policy.  If a visitor’s behavior poses a threat to safety call refer to organization policy.  - Initiate consult with , Psychosocial CNS, Spiritual Care as appropriate  Outcome: Progressing

## 2019-08-20 NOTE — PROGRESS NOTES
[facility] hospitalist daily note  Patient was seen/examined on 8/20/19     S; pt denies chest pain, no SOB,  no abd pain  No bleeding  Daughter at the bedside and updated with pt's permission     Medications in Epic     PE    08/20/19  0845   BP:    Pulse: 66 glass opacities, small right pleural effusion.  Pt is on lasix (on it as outpatient as well)  He is on antibiotic for UTI  No cough during my visit  pulm involved in care        #BPH, GERD, HTN- meds ordered          PPX: lovenox Subcutaneous  Dispo: inrinku c

## 2019-08-20 NOTE — PROGRESS NOTES
[facility] Pharmacy Note: Route Optimization for Pantoprazole (PROTONIX)    Patient is currently on Pantoprazole (PROTONIX) 40 mg IV every 24 hours.    The patient meets the criteria to convert to the oral equivalent as established by the IV to Oral conversion pro

## 2019-08-20 NOTE — PROGRESS NOTES
[facility]  Neurology  Hospital Progress Report    Rula Sears Patient Status:  Inpatient    1932 MRN [id number]   [facility] 3NE-A Attending Kristin Dempsey, [address] [facility] Day # 6 CHARAN Escobedo MD   Date of Admi 10 mL IV push 40 mg Intravenous Q24H   Doxycycline Hyclate (VIBRAMYCIN) 100 mg in sodium chloride 0.9% 100 mL IVPB 100 mg Intravenous Q12H   traZODone HCl (DESYREL) tab 50 mg 50 mg Oral Nightly   QUEtiapine Fumarate (SEROQUEL) tab 50 mg 50 mg Oral Nightly [id number] UNIT/GM External Cream Apply topically 2 (two) times daily. omeprazole 20 MG Oral Capsule Delayed Release Take 20 mg by mouth every morning before breakfast.   traZODone HCl 50 MG Oral Tab Take 50 mg by mouth nightly.    cholecalciferol (VITAMIN D3 the examiner. Cranial nerves: No definite facial asymmetry. No nystagmus. Speech is understandable. .  Motor: There is no abnormal involuntary movement. No definite focal weakness  No asterixis or myoclonus. Sensation:  no neglect.   Coordination: extra-axial fluid collection. Lucencies in the deep periventricular white matter are likely sequelae of chronic small vessel ischemic disease. Prominence of the sulci is noted. Focal hyperdensity in the distal left M1 segment is noted.  Visualized portions 8/19/2019  CONCLUSION:  1. No acute intracranial hemorrhage or hydrocephalus. 2. Mild to moderate chronic small vessel ischemic disease.  If there is clinical concern for acute ischemia/infarction, an MRI of the brain would be recommended for further evalua psychiatry. Dementia with behavioral disturbance ([facility] [address]  Chronic and stable      Pulmonary embolus  Defer to pulmonary medicine and hospitalist team.    We will not follow daily. Please call if there are unanswered diagnostic or therapeutic questions.

## 2019-08-20 NOTE — PROGRESS NOTES
[facility] Pharmacy Note: Route Optimization for Doxycycline (VIBRAMYCIN)    Patient is currently on Doxycycline (VIBRAMYCIN) 100 mg IV every 12 hours.    The patient meets the criteria to convert to the oral equivalent as established by the IV to Oral conversion

## 2019-08-21 NOTE — PHYSICAL THERAPY NOTE
PHYSICAL THERAPY QUICK EVALUATION - INPATIENT    Room Number: 3992/5431-N  Evaluation Date: 8/21/2019  Presenting Problem: Dementia with Agitation  Physician Order: PT Eval and Treat  History re: current admission : Patient admitted from nursing facility Unable to formally assess ROM & strength in UEs & LEs due to patient was resistive,    NEUROLOGICAL FINDINGS  Neurological Findings: None                   ACTIVITY TOLERANCE                         O2 WALK                  AM-PAC '6-Clicks' INPATIENT SHOR bed. RN - Damon Seen was notified re: above. Patient End of Session: In bed;Needs met;Call light within reach;RN aware of session/findings; All patient questions and concerns addressed; Alarm set; Discussed recommendations with /    AS

## 2019-08-21 NOTE — CM/SW NOTE
JOSE ROBERTO follow up on d/c plan, left a message for Camila Rosas, director at [place] [address]. Awaiting call back, will follow.        Fernandez John  #[phone]        JOSE ROBERTO left two messages for Camila Rosas at [phone], waiting

## 2019-08-21 NOTE — PROGRESS NOTES
[address] Patient Status:  Inpatient    1932 MRN [id number]   [facility] 3NE-A Attending Yessica Carrillo MD   Hosp Day # 7 PCP Kirstie Patel MD     SUBJECTIVE: Pt denies complaints this am.  No SOB or ch mL, 30 mL, Oral, Daily PRN  •  Memantine HCl (NAMENDA) tab 10 mg, 10 mg, Oral, BID with meals  •  mirtazapine (REMERON) tab 30 mg, 30 mg, Oral, Nightly  •  PEG 3350 (MIRALAX) powder packet 17 g, 17 g, Oral, QOD  •  vancomycin HCl (FIRVANQ) 50 MG/ML oral so

## 2019-08-21 NOTE — PROGRESS NOTES
[facility] hospitalist daily note  Patient was seen/examined on 8/21/19     S; pt denies chest pain, no SOB,  no abd pain  No bleeding       Medications in Epic     PE    08/21/19  1200   BP:    Pulse: 67   Resp: 18   Temp:         Gen: awake, alert, no respirato Refills from PCP     Abnormal CT chest; ground glass opacities, small right pleural effusion.  Pt is on lasix (on it as outpatient as well)  He is on antibiotic for UTI  No cough during my visit  pulm involved in care        #BPH, GERD, HTN- meds ordered

## 2019-08-21 NOTE — PLAN OF CARE
NURSING DISCHARGE NOTE    Discharged to [facility] via ambulance. Accompanied by ambulance staff. Belongings sent with pt. Discharge paperwork, Rx x5 placed in envelope, provided to ambulance staff, to be given to [facility] RN. IV removed.

## 2019-08-21 NOTE — CM/SW NOTE
In anticipation of potential discharge, message left for marie in admissions @ 12 [facility] [phone]) to confirm if patient able to return; await call back

## 2019-08-21 NOTE — PROGRESS NOTES
I spoke by phone yesterday with the patient's daughter. I reviewed the diagnosis of dementia with her. She tells me that a diagnosis of vascular dementia was made 4 years ago.   I reviewed with her the available information at this time including imaging

## 2019-08-21 NOTE — PLAN OF CARE
A/O x 2. Can yell at times, but is cooperative. Glasses  Pills crushed in chocolate pudding.  Attempted to give 1 pill whole, pt not able to swallow capsule  Xarelto started for PE- given tonight  Tele NSR  Electrolyte protocol  Doxycycline PO for UTI  Amaryllis Balloon Care Agreement if patient meets criteria  - If a patient’s behavior jeopardizes the safety of the patient, staff, or others refer to organization policy.  If a visitor’s behavior poses a threat to safety call refer to organization policy.  - Initiate consul

## 2019-08-21 NOTE — PLAN OF CARE
Pt alert and oriented to self. Cooperative. No c/o pain. VSS. Afebrile. RA.  PO abx. Prophylactic PO vanco.  BM x1.  Bed alarm. PT to re-eval.  Will monitor closely. 1410 - pt to dc today.  Ambulance to  pt at [phone].    1550 - report called t organization policy.  If a visitor’s behavior poses a threat to safety call refer to organization policy.  - Initiate consult with , Psychosocial CNS, Spiritual Care as appropriate  Outcome: Progressing

## 2019-08-21 NOTE — CM/SW NOTE
SW confirmed with Guero Bowman at [facility] that they are able to accept pt today, he will be going to a private room. Unit RN to call nurse report to [phone]. SW called and updated Pt's daughter, Mika Khan, of same.  She p

## 2019-08-23 NOTE — DISCHARGE SUMMARY
[facility]  Discharge Summary    Chuy Cloud Patient Status:  Inpatient    1932 MRN [id number]   [facility] 3NE-A Attending No att. providers found   Hosp Day # 7 PCP Karla Escobedo MD     Date of Admission: 2019 before). First dose of Antibiotic in the hospital was in the evening of 8/14 thus today is the 7th/7 full day of antibiotic therapy.  Repeat UA showed negative nitrite, negative leukocytes  Per daughter pt with severe reaction to  pencillins in the past wit daily., Print Script, Disp-30 tablet, R-0Refills from Dr. Kelby Olivares MD    atorvastatin 10 MG Oral Tab  Take 1 tablet (10 mg total) by mouth nightly. , Print Script, Disp-30 tablet, R-0Refills from Dr. Kelby Olivares MD    Doxycycline Hyclate 100 Units by mouth daily. , Historical    acetaminophen 325 MG Oral Tab  Take 325 mg by mouth every 6 (six) hours as needed for Pain., Historical    albuterol sulfate (2.5 MG/3ML) 0.083% Inhalation Nebu Soln  Take by nebulization every 6 (six) hours as needed f tests monitored by Dr. Jared Ernandez MD     Check blood pressure, keep record and bring to appointment with Dr. Jared Ernandez MD        Notify physician if you experience any of the followin. Fever  2. . persistent Nausea/vomiting  3. severe

## 2019-09-23 NOTE — PROGRESS NOTES
Pt is in a nursing home and pt is being treated by the care of Dr. Brooke Ryder. Pt's daughter states that his pcp sees him at [facility] in [place].
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
## (undated) NOTE — IP AVS SNAPSHOT
[address]            (For Outpatient Use Only) Initial Admit Date: 8/14/2019   Inpt/Obs Admit Date: Inpt: 8/14/19 / Obs: N/A   Discharge Date:    Anjel Knapp:  [de-identified]   MRN: [de-identified]   CSN: [id number]   CEID: [id number] Group Number:  Insurance Type:    Subscriber Name:  Subscriber :    Subscriber ID:  Pt Rel to Subscriber:    Hospital Account Financial Class: Medicare    2019

## (undated) NOTE — IP AVS SNAPSHOT
Patient Demographics     Address  [facility]  [address] [place] [address] [address] [address] Phone  [phone] [facility]  [phone] [facility] E-mail Address  [email or web address]      Emergency Contact(s)     Name Relation Home Work [address] [facility] 9. Numbness/tingling  10. Difficulty urinating or defecating  11. bleeding    Do not drive when taking pain medications  Do not exceed 4 grams acetaminophen within 24 hours      Follow-up Information     Juan Carlos Triplett MD In 1 week.     Specialty:  Timothy Vanegas Commonly known as:  TUMS  Next dose due:  8/22 AM      Chew 1 tablet by mouth daily. cholecalciferol 5000 units Caps  Commonly known as:  VITAMIN D3  Next dose due:  8/22 AM      Take 5,000 Units by mouth daily.           Doxycycline Hyclate 100 MG Commonly known as:  DESYREL  Next dose due:  8/21 PM      Take 50 mg by mouth nightly. Vancomycin HCl 25 MG/ML Solr  Next dose due:  8/21 PM      Take 125 mg by mouth 2 (two) times daily.                 Where to Get Your Medications      Please pi [id number] mirtazapine (REMERON) tab 30 mg 08/20/19 2141 Given      [id number] traZODone HCl (DESYREL) tab 50 mg 08/20/19 2141 Given      [id number] vancomycin HCl MICHELLE [facility]) 50 MG/ML oral solution 125 mg 08/20/19 2143 Given      [id number] vancomycin HCl (FIR • Subdural hematoma ([facility] [address]       History reviewed. No pertinent surgical history. ALL:    Iodine (Topical)        UNKNOWN  Penicillin G Benzat*    UNKNOWN  Sulfa Antibiotics       UNKNOWN       No current outpatient medications on file.     Social Histor PROCEDURE:  XR CHEST AP PORTABLE  (CPT=71045)  TECHNIQUE:  AP chest radiograph was obtained. COMPARISON:  None. INDICATIONS:  Eval-P  PATIENT STATED HISTORY: (As transcribed by Technologist)  Patient offered no additional history at this time.     FINDING Consults signed by Delphine Maciel MD at 8/19/2019  6:06 PM     Author:  Delphine Maciel MD Service:  Pulmonology Author Type:  Physician    Filed:  8/19/2019  6:06 PM Date of Service:  8/19/2019  4:22 PM Status:  Signed    :  Cuauhtemoc Perdomo amLODIPine Besylate 5 MG Oral Tab Take 5 mg by mouth daily. Disp:  Rfl:  8/13/2019 at 9am   Calcium Carbonate Antacid 500 MG Oral Chew Tab Chew 1 tablet by mouth daily. Disp:  Rfl:  8/13/2019 at 9am    finasteride 5 MG Oral Tab Take 5 mg by mouth daily.  Concetta Conquest guaiFENesin--10 MG/5ML Oral Syrup Take 5 mL by mouth 3 (three) times daily as needed for cough. Disp:  Rfl:  More than a month at Unknown time   haloperidol 1 MG Oral Tab Take 1 mg by mouth 4 (four) times daily.  Disp:  Rfl:  Unknown at Unknown time Family History:  No family history on file. [DM.1]   Denies h/o any medical problems[DM. 2]  Home Medications:    Outpatient Medications Marked as Taking [facility]):  QUEtiapine Fumarate 25 MG Oral Tab Take 50 mg by mo MEROPENEM IV Inject into the vein.  Disp:  Rfl:        Scheduled Medication:  • predniSONE  50 mg Oral Q6H   • [START ON 8/20/2019] diphenhydrAMINE HCl  50 mg Oral Once   • aspirin  81 mg Oral Daily   • Normal Saline Flush  10 mL Intravenous Q12H   • metopr O2 Flow Rate (L/min): 3 L/min  Pulse Oximetry Type: Continuous  Oximetry Probe Site Changed: Yes  Pulse Ox Probe Location: Left hand                  Wt Readings from Last 3 Encounters:  08/14/19 : 205 lb (93 kg)        Intake/Output Summary (Last 24 hours Invalid input(s): ARTERIALPH, ARTERIALPO2, ARTERIALPCO2, ARTERIALHCO3    No results for input(s): BNP in the last 72 hours.     Invalid input(s): TROPI    Albumin   Date/Time Value Ref Range Status   08/19/2019 03:35 AM 2.8 (L) 3.4 - 5.0 g/dL Final       Im • BPH (benign prostatic hyperplasia)    • COPD (chronic obstructive pulmonary disease) (HCC)    • Dementia (HCC)    • Depression    • Esophageal reflux    • Essential hypertension    • Hyperlipidemia    • Subdural hematoma [facility])        Past Surgical Histor -   Moving from lying on back to sitting on the side of the bed?: A Lot   How much help from another person does the patient currently need. ..   -   Moving to and from a bed to a chair (including a wheelchair)?: A Lot   -   Need to walk in hospital room?: include AM PAC scores. Based on this evaluation, patient's clinical presentation is stable and overall evaluation complexity is considered low. PT Discharge Recommendations: 24 hour care/supervision; Long term care  Recommend use of lift equipment for out